# Patient Record
Sex: MALE | Race: OTHER | Employment: FULL TIME | URBAN - METROPOLITAN AREA
[De-identification: names, ages, dates, MRNs, and addresses within clinical notes are randomized per-mention and may not be internally consistent; named-entity substitution may affect disease eponyms.]

---

## 2022-12-21 ENCOUNTER — HOSPITAL ENCOUNTER (EMERGENCY)
Age: 62
Discharge: HOME | End: 2022-12-21
Attending: EMERGENCY MEDICINE
Payer: COMMERCIAL

## 2022-12-21 ENCOUNTER — APPOINTMENT (OUTPATIENT)
Dept: GENERAL RADIOLOGY | Age: 62
End: 2022-12-21
Attending: NURSE PRACTITIONER
Payer: COMMERCIAL

## 2022-12-21 VITALS
HEIGHT: 74 IN | TEMPERATURE: 99 F | OXYGEN SATURATION: 97 % | DIASTOLIC BLOOD PRESSURE: 64 MMHG | WEIGHT: 200 LBS | SYSTOLIC BLOOD PRESSURE: 134 MMHG | BODY MASS INDEX: 25.67 KG/M2 | RESPIRATION RATE: 20 BRPM | HEART RATE: 98 BPM

## 2022-12-21 DIAGNOSIS — R07.81 RIB PAIN ON RIGHT SIDE: ICD-10-CM

## 2022-12-21 DIAGNOSIS — U07.1 COVID: Primary | ICD-10-CM

## 2022-12-21 DIAGNOSIS — S22.41XA CLOSED FRACTURE OF MULTIPLE RIBS OF RIGHT SIDE, INITIAL ENCOUNTER: ICD-10-CM

## 2022-12-21 DIAGNOSIS — Z87.898 HISTORY OF FEVER: ICD-10-CM

## 2022-12-21 DIAGNOSIS — M79.10 MYALGIA: ICD-10-CM

## 2022-12-21 DIAGNOSIS — J11.1 INFLUENZA: ICD-10-CM

## 2022-12-21 LAB
POCT INFLUENZA A: NEGATIVE
POCT INFLUENZA B: POSITIVE
SARS-COV-2 RNA RESP QL NAA+PROBE: DETECTED

## 2022-12-21 PROCEDURE — 87502 INFLUENZA DNA AMP PROBE: CPT | Performed by: NURSE PRACTITIONER

## 2022-12-21 PROCEDURE — 99283 EMERGENCY DEPT VISIT LOW MDM: CPT

## 2022-12-21 PROCEDURE — 99284 EMERGENCY DEPT VISIT MOD MDM: CPT

## 2022-12-21 PROCEDURE — 71101 X-RAY EXAM UNILAT RIBS/CHEST: CPT | Performed by: NURSE PRACTITIONER

## 2022-12-21 RX ORDER — ATORVASTATIN CALCIUM 20 MG/1
20 TABLET, FILM COATED ORAL NIGHTLY
COMMUNITY

## 2022-12-21 RX ORDER — IBUPROFEN 600 MG/1
600 TABLET ORAL ONCE
Status: COMPLETED | OUTPATIENT
Start: 2022-12-21 | End: 2022-12-21

## 2022-12-21 RX ORDER — ASPIRIN 81 MG/1
TABLET, CHEWABLE ORAL DAILY
COMMUNITY

## 2022-12-21 RX ORDER — METOPROLOL SUCCINATE 50 MG/1
50 TABLET, EXTENDED RELEASE ORAL DAILY
COMMUNITY

## 2022-12-21 RX ORDER — BENZONATATE 100 MG/1
100 CAPSULE ORAL 3 TIMES DAILY PRN
Qty: 30 CAPSULE | Refills: 0 | Status: SHIPPED | OUTPATIENT
Start: 2022-12-21 | End: 2023-01-20

## 2022-12-21 NOTE — ED PROVIDER NOTES
I have seen and examined the patient and agree with the assessment and plan as outlined in the documentation by the physician's assistant. I provided a substantive portion of care for this patient. I personally performed the Medical Decision Making for this encounter. The patient presents with fever and coughing. The patient had a fall as well. He has had the body aches and chills with fever for the past 3 days. He also fell 3 days ago and landed on his right ribs. He has pain with breathing. Physical exam shows nontoxic appearing 65-year-old male. He appears to have no tachypnea. Good air entry noted on breathing. Some tenderness along the right ribs. He was found to be positive for COVID and influenza. X-rays were performed to assess for pneumothorax, rib fractures, contusion. X-ray showed possible nondisplaced fractures at ribs 8 and 9. He will be given incentive spirometer. Continue to monitor for any worsening symptoms. Pain control at home. Discharge good condition. He will need to follow-up with his physician regarding his COVID and influenza. Discussion on whether the patient would take Paxlovid he will follow-up with his primary care physician.

## 2022-12-21 NOTE — ED INITIAL ASSESSMENT (HPI)
To er with body aches and uri sx for past 2 days with fever that started today.   Last med for pain or fever was tylenol yesterday

## 2025-01-14 NOTE — ED QUICK NOTES
Patient was provided with Incentive Spirometry , and instructed on use. Patient verbalized understanding. No

## 2025-03-26 ENCOUNTER — OFFICE VISIT (OUTPATIENT)
Dept: INTERNAL MEDICINE CLINIC | Facility: CLINIC | Age: 65
End: 2025-03-26
Payer: COMMERCIAL

## 2025-03-26 VITALS
WEIGHT: 214.81 LBS | DIASTOLIC BLOOD PRESSURE: 68 MMHG | RESPIRATION RATE: 16 BRPM | TEMPERATURE: 98 F | BODY MASS INDEX: 27.57 KG/M2 | HEART RATE: 72 BPM | HEIGHT: 74 IN | SYSTOLIC BLOOD PRESSURE: 150 MMHG | OXYGEN SATURATION: 95 %

## 2025-03-26 DIAGNOSIS — Z12.11 SCREENING FOR COLON CANCER: ICD-10-CM

## 2025-03-26 DIAGNOSIS — E11.65 TYPE 2 DIABETES MELLITUS WITH HYPERGLYCEMIA, WITH LONG-TERM CURRENT USE OF INSULIN (HCC): ICD-10-CM

## 2025-03-26 DIAGNOSIS — Z79.4 TYPE 2 DIABETES MELLITUS WITH HYPERGLYCEMIA, WITH LONG-TERM CURRENT USE OF INSULIN (HCC): ICD-10-CM

## 2025-03-26 DIAGNOSIS — Z00.00 ROUTINE PHYSICAL EXAMINATION: Primary | ICD-10-CM

## 2025-03-26 DIAGNOSIS — I10 PRIMARY HYPERTENSION: ICD-10-CM

## 2025-03-26 DIAGNOSIS — M79.651 RIGHT THIGH PAIN: ICD-10-CM

## 2025-03-26 DIAGNOSIS — Z12.5 SCREENING FOR PROSTATE CANCER: ICD-10-CM

## 2025-03-26 DIAGNOSIS — E78.5 TYPE 2 DIABETES MELLITUS WITH HYPERLIPIDEMIA (HCC): ICD-10-CM

## 2025-03-26 DIAGNOSIS — E11.69 TYPE 2 DIABETES MELLITUS WITH HYPERLIPIDEMIA (HCC): ICD-10-CM

## 2025-03-26 PROCEDURE — 99386 PREV VISIT NEW AGE 40-64: CPT | Performed by: INTERNAL MEDICINE

## 2025-03-26 RX ORDER — HYDROCHLOROTHIAZIDE 12.5 MG/1
1 CAPSULE ORAL CONTINUOUS
Qty: 3 EACH | Refills: 0 | Status: SHIPPED | OUTPATIENT
Start: 2025-03-26

## 2025-03-26 RX ORDER — ACYCLOVIR 400 MG/1
TABLET ORAL
COMMUNITY
Start: 2024-01-25

## 2025-03-26 RX ORDER — INSULIN GLARGINE 100 [IU]/ML
INJECTION, SOLUTION SUBCUTANEOUS
COMMUNITY
Start: 2025-02-18

## 2025-03-26 RX ORDER — ATORVASTATIN CALCIUM 40 MG/1
40 TABLET, FILM COATED ORAL NIGHTLY
Qty: 90 TABLET | Refills: 0 | Status: SHIPPED | OUTPATIENT
Start: 2025-03-26

## 2025-03-26 NOTE — PATIENT INSTRUCTIONS
Please have blood tests done    Stop the lantus and lets continue the meal time insulin (insulin lispro)    Continue the metformin and atorvastatin    Lets stop the farxiga for now    Lets me know what  your sugars are in 1 week     Please do the FIT test for colon cancer screening    Try heat for 20 minutes then a few hours later try ice for 20 minutes for the thigh and see if that helps.  It may take up to 6 weeks to resolve    See me before you leave for your trip

## 2025-03-26 NOTE — PROGRESS NOTES
Patient Office Visit    ASSESSMENT AND PLAN:   1. Routine physical examination  Note: Continue to exercise at least 150 minutes a week and Eat a plant based diet. Please take 2000 IU of vitamin D daily for life to keep your bones strong. Please see your dentist every 6 months.  Continue with regular eye exams  - ALT(SGPT); Future  - AST (SGOT); Future  - Basic Metabolic Panel (8); Future  - Lipid Panel; Future  - CBC With Differential With Platelet; Future  - Hemoglobin A1C; Future  - TSH W Reflex To Free T4; Future  - Microalb/Creat Ratio, Random Urine; Future    2. Type 2 diabetes mellitus with hyperglycemia, with long-term current use of insulin (HCC)  Note: Advised to stop the Lantus and we will continue with the sliding scale insulin as patient feels that his sugar is very high after eating because he eats a good portion of food.  Will obtain A1c results.  As Farxiga is not covered through insurance.  And right now.  Will continue with the metformin  - Hemoglobin A1C; Future  - Microalb/Creat Ratio, Random Urine; Future  - Continuous Glucose Sensor (FREESTYLE ARNOL 3 PLUS SENSOR) Does not apply Misc; 1 Device continuous.  Dispense: 3 each; Refill: 0    3. Type 2 diabetes mellitus with hyperlipidemia (HCC)  Note: Continue statin  - Hemoglobin A1C; Future  - Microalb/Creat Ratio, Random Urine; Future  - atorvastatin 40 MG Oral Tab; Take 1 tablet (40 mg total) by mouth nightly.  Dispense: 90 tablet; Refill: 0    4. Screening for colon cancer  - Occult Blood, Fecal, Immunoassay (Blue cards) [E]; Future    5. Screening for prostate cancer  - PSA, Total (Screening) [E]; Future    6. Primary hypertension  Note: Will continue metoprolol for now and if blood pressure still high next visit then we will switch metoprolol to carvedilol or consider enalapril     7.  Right thigh pain  Note: Likely MSK strain.  Continue with heat/ice and it may take 6 weeks to resolve          Patient/Caregiver Education: Patient/Caregiver  Education: There are no barriers to learning. Medical education done. Outcome: Patient verbalizes understanding. Patient is notified to call with any questions, complications, allergies, or worsening or changing symptoms.  Patient is to call with any side effects or complications from the treatments as a result of today.      Reviewed Past Medical History and   Patient Active Problem List   Diagnosis    Type 2 diabetes mellitus with hyperlipidemia (HCC)    Type 2 diabetes mellitus with hyperglycemia, with long-term current use of insulin (HCC)    Primary hypertension       Orders Placed This Encounter   Procedures    ALT(SGPT)     Standing Status:   Future     Standing Expiration Date:   3/26/2026    AST (SGOT)     Standing Status:   Future     Standing Expiration Date:   3/26/2026    Basic Metabolic Panel (8)     Standing Status:   Future     Standing Expiration Date:   3/26/2026    Lipid Panel     Standing Status:   Future     Standing Expiration Date:   3/26/2026    CBC With Differential With Platelet     Standing Status:   Future     Standing Expiration Date:   3/26/2026    Hemoglobin A1C     Standing Status:   Future     Standing Expiration Date:   3/26/2026    TSH W Reflex To Free T4     Standing Status:   Future     Standing Expiration Date:   3/26/2026    Microalb/Creat Ratio, Random Urine     Standing Status:   Future     Standing Expiration Date:   3/26/2026     Order Specific Question:   Release to patient     Answer:   Immediate    Occult Blood, Fecal, Immunoassay (Blue cards) [E]     Standing Status:   Future     Standing Expiration Date:   3/26/2026     Order Specific Question:   Release to patient     Answer:   Immediate    PSA, Total (Screening) [E]     Standing Status:   Future     Standing Expiration Date:   3/26/2026     Order Specific Question:   Release to patient     Answer:   Immediate     Requested Prescriptions     Signed Prescriptions Disp Refills    Continuous Glucose Sensor (FREESTYLE  ARNOL 3 PLUS SENSOR) Does not apply Misc 3 each 0     Si Device continuous.    atorvastatin 40 MG Oral Tab 90 tablet 0     Sig: Take 1 tablet (40 mg total) by mouth nightly.         Florida Hua DO  CC:  Chief Complaint   Patient presents with    [facility]    Physical         HPI:   Karen Walker is a 64-year-old male who presents for a physical.  Son helps to translate    Type 2 diabetes: Patient has been on Lantus and short acting insulin for some time.  He has been having very low sugars at nighttime.  It is quite confusing as to what he is taking but he is taking 16 units with meals and may be taking 16 units of Lantus during lunchtime as well.  He may not be taking anything during dinnertime.  He has been taking Farxiga and metformin as well.  Farxiga is no longer covered.  They would prefer Dexcom, but Dexcom was not covered.  Hypertension/hyperlipidemia: Has been taking the medicine  Right thigh pain: Started about 2 weeks ago.  He has been walking on the treadmill 2 weeks ago as well.  No recent falls or trauma.  No fevers or chills.  He still works and is a     Past Medical History:    Diabetes (HCC)    Essential hypertension    Hyperlipidemia       History reviewed. No pertinent surgical history.    Social History:  Social History     Socioeconomic History    Marital status:    Tobacco Use    Smoking status: Never    Smokeless tobacco: Never   Vaping Use    Vaping status: Never Used   Substance and Sexual Activity    Alcohol use: Never    Drug use: Never   Other Topics Concern    Caffeine Concern No    Exercise No    Seat Belt Yes    Special Diet No    Stress Concern No    Weight Concern No     Social Drivers of Health     Food Insecurity: No Food Insecurity (3/26/2025)    NCSS - Food Insecurity     Worried About Running Out of Food in the Last Year: No     Ran Out of Food in the Last Year: No   Transportation Needs: No Transportation Needs (3/26/2025)    NCSS -  Transportation     Lack of Transportation: No   Stress: Not on File (10/7/2022)    Received from [employer]    Stress     Stress: 0   Housing Stability: Not At Risk (3/26/2025)    NCSS - Housing/Utilities     Has Housing: Yes     Worried About Losing Housing: No     Unable to Get Utilities: No     Family History:  Family History   Problem Relation Age of Onset    Diabetes Mother     Heart Disorder Mother     Hypertension Mother     Lipids Mother      Allergies:  Allergies[1]  Current Meds:  Medications Ordered Prior to Encounter[2]      REVIEW OF SYSTEMS   Constitutional: no fatigue normal energy no weight changes   HENT: normal sinuses and no mouth issues   Eyes: . normal vision no eye pain   Respiratory: normal respirations no cough   Cardiovascular: no CP, or palpitations   Gastrointestinal: normal bowels and no abd pains   Genitourinary:  normal urination no hematuria, no frequency   Musculoskeletal: no pains in arms/legs, normal range of motion   Skin: no rashes or skin lesions that are new   Neurological:  no weakness, no numbness, normal gait   Hematological:  no bruises or bleeding   Psychiatric/Behavioral: normal mood no anxiety normal behavior     /68 (BP Location: Right arm, Patient Position: Sitting, Cuff Size: adult)   Pulse 72   Temp 97.7 °F (36.5 °C) (Temporal)   Resp 16   Ht 6' 2\" (1.88 m)   Wt 214 lb 12.8 oz (97.4 kg)   SpO2 95%   BMI 27.58 kg/m²     PHYSICAL EXAM:   Constitutional: Vital signs reviewed as noted, well developed, in no acute distress.   HENT: NCAT, bilateral ear canal and tympanic membrane appear normal  Eyes: pupils reactive bilaterally  Neck: No thyroidmegaly  Cardiovascular: nl s1 s2 no m/r/g  Pulmonary/Chest: CTA bilaterally with no wheezes  Abdominal: Soft NT normal Bowel sounds  MSK: Full range of motion of the knee and leg  Extremities: no pedal edema   Neurological:  no weakness in UE and LE, reflexes are normal  Skin: no rashes or bruises on visualized skin, not  undressed   Psychiatric:normal mood              [1] No Known Allergies  [2]   Current Outpatient Medications on File Prior to Visit   Medication Sig Dispense Refill    Continuous Glucose Sensor (DEXCOM G7 SENSOR) Does not apply Misc       BASAGLAR KWIKPEN 100 UNIT/ML Subcutaneous Solution Pen-injector INJECT 20 UINITS DAILY      metFORMIN HCl 1000 MG Oral Tab Take 1 tablet (1,000 mg total) by mouth 2 (two) times daily with meals.      metoprolol succinate ER 50 MG Oral Tablet 24 Hr Take 1 tablet (50 mg total) by mouth daily.      aspirin 81 MG Oral Chew Tab Chew by mouth daily.       No current facility-administered medications on file prior to visit.

## 2025-03-28 ENCOUNTER — APPOINTMENT (OUTPATIENT)
Dept: LAB | Age: 65
End: 2025-03-28
Attending: INTERNAL MEDICINE
Payer: COMMERCIAL

## 2025-03-28 PROCEDURE — 82274 ASSAY TEST FOR BLOOD FECAL: CPT

## 2025-03-29 ENCOUNTER — LAB ENCOUNTER (OUTPATIENT)
Dept: LAB | Age: 65
End: 2025-03-29
Attending: INTERNAL MEDICINE
Payer: COMMERCIAL

## 2025-03-29 DIAGNOSIS — Z00.00 ROUTINE PHYSICAL EXAMINATION: ICD-10-CM

## 2025-03-29 DIAGNOSIS — E11.65 TYPE 2 DIABETES MELLITUS WITH HYPERGLYCEMIA, WITH LONG-TERM CURRENT USE OF INSULIN (HCC): ICD-10-CM

## 2025-03-29 DIAGNOSIS — Z12.5 SCREENING FOR PROSTATE CANCER: ICD-10-CM

## 2025-03-29 DIAGNOSIS — Z79.4 TYPE 2 DIABETES MELLITUS WITH HYPERGLYCEMIA, WITH LONG-TERM CURRENT USE OF INSULIN (HCC): ICD-10-CM

## 2025-03-29 DIAGNOSIS — Z12.11 SCREENING FOR COLON CANCER: ICD-10-CM

## 2025-03-29 DIAGNOSIS — E78.5 TYPE 2 DIABETES MELLITUS WITH HYPERLIPIDEMIA (HCC): ICD-10-CM

## 2025-03-29 DIAGNOSIS — E11.69 TYPE 2 DIABETES MELLITUS WITH HYPERLIPIDEMIA (HCC): ICD-10-CM

## 2025-03-29 LAB
ALT SERPL-CCNC: 25 U/L
ANION GAP SERPL CALC-SCNC: 11 MMOL/L (ref 0–18)
AST SERPL-CCNC: 19 U/L (ref ?–34)
BASOPHILS # BLD AUTO: 0 X10(3) UL (ref 0–0.2)
BASOPHILS NFR BLD AUTO: 0 %
BUN BLD-MCNC: 13 MG/DL (ref 9–23)
CALCIUM BLD-MCNC: 10.1 MG/DL (ref 8.7–10.6)
CHLORIDE SERPL-SCNC: 104 MMOL/L (ref 98–112)
CHOLEST SERPL-MCNC: 129 MG/DL (ref ?–200)
CO2 SERPL-SCNC: 27 MMOL/L (ref 21–32)
COMPLEXED PSA SERPL-MCNC: 0.7 NG/ML (ref ?–4)
CREAT BLD-MCNC: 0.85 MG/DL
CREAT UR-SCNC: 71.2 MG/DL
EGFRCR SERPLBLD CKD-EPI 2021: 98 ML/MIN/1.73M2 (ref 60–?)
EOSINOPHIL # BLD AUTO: 0.07 X10(3) UL (ref 0–0.7)
EOSINOPHIL NFR BLD AUTO: 2 %
ERYTHROCYTE [DISTWIDTH] IN BLOOD BY AUTOMATED COUNT: 12.9 %
EST. AVERAGE GLUCOSE BLD GHB EST-MCNC: 143 MG/DL (ref 68–126)
FASTING PATIENT LIPID ANSWER: YES
FASTING STATUS PATIENT QL REPORTED: YES
GLUCOSE BLD-MCNC: 151 MG/DL (ref 70–99)
HBA1C MFR BLD: 6.6 % (ref ?–5.7)
HCT VFR BLD AUTO: 45.8 %
HDLC SERPL-MCNC: 37 MG/DL (ref 40–59)
HEMOCCULT STL QL: NEGATIVE
HGB BLD-MCNC: 15.6 G/DL
IMM GRANULOCYTES # BLD AUTO: 0.01 X10(3) UL (ref 0–1)
IMM GRANULOCYTES NFR BLD: 0.3 %
LDLC SERPL CALC-MCNC: 74 MG/DL (ref ?–100)
LYMPHOCYTES # BLD AUTO: 1.55 X10(3) UL (ref 1–4)
LYMPHOCYTES NFR BLD AUTO: 44.4 %
MCH RBC QN AUTO: 30.4 PG (ref 26–34)
MCHC RBC AUTO-ENTMCNC: 34.1 G/DL (ref 31–37)
MCV RBC AUTO: 89.1 FL
MICROALBUMIN UR-MCNC: <0.3 MG/DL
MONOCYTES # BLD AUTO: 0.5 X10(3) UL (ref 0.1–1)
MONOCYTES NFR BLD AUTO: 14.3 %
NEUTROPHILS # BLD AUTO: 1.36 X10 (3) UL (ref 1.5–7.7)
NEUTROPHILS # BLD AUTO: 1.36 X10(3) UL (ref 1.5–7.7)
NEUTROPHILS NFR BLD AUTO: 39 %
NONHDLC SERPL-MCNC: 92 MG/DL (ref ?–130)
OSMOLALITY SERPL CALC.SUM OF ELEC: 297 MOSM/KG (ref 275–295)
PLATELET # BLD AUTO: 152 10(3)UL (ref 150–450)
POTASSIUM SERPL-SCNC: 4.3 MMOL/L (ref 3.5–5.1)
RBC # BLD AUTO: 5.14 X10(6)UL
SODIUM SERPL-SCNC: 142 MMOL/L (ref 136–145)
TRIGL SERPL-MCNC: 97 MG/DL (ref 30–149)
TSI SER-ACNC: 0.84 UIU/ML (ref 0.55–4.78)
VLDLC SERPL CALC-MCNC: 15 MG/DL (ref 0–30)
WBC # BLD AUTO: 3.5 X10(3) UL (ref 4–11)

## 2025-03-29 PROCEDURE — 83036 HEMOGLOBIN GLYCOSYLATED A1C: CPT

## 2025-03-29 PROCEDURE — 36415 COLL VENOUS BLD VENIPUNCTURE: CPT

## 2025-03-29 PROCEDURE — 82570 ASSAY OF URINE CREATININE: CPT

## 2025-03-29 PROCEDURE — 80061 LIPID PANEL: CPT

## 2025-03-29 PROCEDURE — 84460 ALANINE AMINO (ALT) (SGPT): CPT

## 2025-03-29 PROCEDURE — 82043 UR ALBUMIN QUANTITATIVE: CPT

## 2025-03-29 PROCEDURE — 85025 COMPLETE CBC W/AUTO DIFF WBC: CPT

## 2025-03-29 PROCEDURE — 84443 ASSAY THYROID STIM HORMONE: CPT

## 2025-03-29 PROCEDURE — 80048 BASIC METABOLIC PNL TOTAL CA: CPT

## 2025-03-29 PROCEDURE — 84450 TRANSFERASE (AST) (SGOT): CPT

## 2025-03-31 NOTE — PROGRESS NOTES
Dear RN, please let the patient know   1. Diabetes is well controlled. Any low sugars since stopping the lantus? Did the freestyle brayan get approved? Our goal A1c is to be less than 7. We can remain off farxiga as well  2. Kidney function is normal  3. Cholesterol is in good range  4. Your white blood cell count is slightly low  5. Your liver function is normal  6. Thyroid function is normal  7. Urine test shows that the kidneys are functioning well from a diabetes standpoint  8. Prostate blood test is normal  9. Stool test was negative for blood  Please let me know if you have any questions  Floriad Hua DO

## 2025-04-02 ENCOUNTER — TELEPHONE (OUTPATIENT)
Dept: INTERNAL MEDICINE CLINIC | Facility: CLINIC | Age: 65
End: 2025-04-02

## 2025-04-02 RX ORDER — ACYCLOVIR 400 MG/1
1 TABLET ORAL
Qty: 3 EACH | Refills: 0 | Status: SHIPPED | OUTPATIENT
Start: 2025-04-02 | End: 2025-04-02

## 2025-04-02 RX ORDER — ACYCLOVIR 400 MG/1
1 TABLET ORAL
Qty: 3 EACH | Refills: 0 | Status: SHIPPED | OUTPATIENT
Start: 2025-04-02

## 2025-04-02 NOTE — TELEPHONE ENCOUNTER
Daughter calling to follow up on CGM. Let her know it was sent today. PT states they need it sent to [facility] not the [facility]

## 2025-04-02 NOTE — TELEPHONE ENCOUNTER
Attempted to call daughter, OK per verbal release. Please let patient's daughter know that CGM has been sent to [facility] pharmacy.

## 2025-04-02 NOTE — TELEPHONE ENCOUNTER
I will send the Dexcom as that is what the patient preferred.  He has a freestyle brayan at home.  We will see if a PA is needed    Florida Hua DO

## 2025-05-11 ENCOUNTER — PATIENT MESSAGE (OUTPATIENT)
Dept: INTERNAL MEDICINE CLINIC | Facility: CLINIC | Age: 65
End: 2025-05-11

## 2025-05-11 DIAGNOSIS — E78.5 TYPE 2 DIABETES MELLITUS WITH HYPERLIPIDEMIA (HCC): ICD-10-CM

## 2025-05-11 DIAGNOSIS — E11.65 TYPE 2 DIABETES MELLITUS WITH HYPERGLYCEMIA, WITH LONG-TERM CURRENT USE OF INSULIN (HCC): Primary | ICD-10-CM

## 2025-05-11 DIAGNOSIS — Z79.4 TYPE 2 DIABETES MELLITUS WITH HYPERGLYCEMIA, WITH LONG-TERM CURRENT USE OF INSULIN (HCC): Primary | ICD-10-CM

## 2025-05-11 DIAGNOSIS — E11.69 TYPE 2 DIABETES MELLITUS WITH HYPERLIPIDEMIA (HCC): ICD-10-CM

## 2025-05-12 RX ORDER — HYDROCHLOROTHIAZIDE 12.5 MG/1
1 CAPSULE ORAL CONTINUOUS
Qty: 3 EACH | Refills: 0 | Status: SHIPPED | OUTPATIENT
Start: 2025-05-12

## 2025-06-26 DIAGNOSIS — Z79.4 TYPE 2 DIABETES MELLITUS WITH HYPERGLYCEMIA, WITH LONG-TERM CURRENT USE OF INSULIN (HCC): ICD-10-CM

## 2025-06-26 DIAGNOSIS — E11.69 TYPE 2 DIABETES MELLITUS WITH HYPERLIPIDEMIA (HCC): ICD-10-CM

## 2025-06-26 DIAGNOSIS — E78.5 TYPE 2 DIABETES MELLITUS WITH HYPERLIPIDEMIA (HCC): ICD-10-CM

## 2025-06-26 DIAGNOSIS — E11.65 TYPE 2 DIABETES MELLITUS WITH HYPERGLYCEMIA, WITH LONG-TERM CURRENT USE OF INSULIN (HCC): ICD-10-CM

## 2025-06-27 RX ORDER — HYDROCHLOROTHIAZIDE 12.5 MG/1
1 CAPSULE ORAL CONTINUOUS
Qty: 3 EACH | Refills: 11 | Status: SHIPPED | OUTPATIENT
Start: 2025-06-27

## 2025-06-27 RX ORDER — HYDROCHLOROTHIAZIDE 12.5 MG/1
1 CAPSULE ORAL CONTINUOUS
Qty: 3 EACH | Refills: 0 | Status: SHIPPED | OUTPATIENT
Start: 2025-06-27 | End: 2025-06-27

## 2025-06-27 NOTE — TELEPHONE ENCOUNTER
Protocol failed    Requesting Continuous Glucose Sensor (FREESTYLE ARNOL 3 PLUS SENSOR) Does not apply Misc   LOV: 03/26/25  RTC: no follow-ups on file  Last Relevant Labs: 03/29/25  Filled: 05/12/25 #3 each with 0 refills    No future appointments.

## 2025-08-05 RX ORDER — INSULIN LISPRO 100 [IU]/ML
INJECTION, SOLUTION INTRAVENOUS; SUBCUTANEOUS
COMMUNITY
Start: 2025-04-11 | End: 2025-08-06

## 2025-08-05 RX ORDER — DAPAGLIFLOZIN 10 MG/1
10 TABLET, FILM COATED ORAL DAILY
COMMUNITY
Start: 2025-06-20 | End: 2025-08-06 | Stop reason: ALTCHOICE

## 2025-08-06 ENCOUNTER — OFFICE VISIT (OUTPATIENT)
Dept: INTERNAL MEDICINE CLINIC | Facility: CLINIC | Age: 65
End: 2025-08-06

## 2025-08-06 VITALS
HEART RATE: 66 BPM | TEMPERATURE: 98 F | HEIGHT: 74 IN | RESPIRATION RATE: 18 BRPM | BODY MASS INDEX: 27.62 KG/M2 | WEIGHT: 215.19 LBS | SYSTOLIC BLOOD PRESSURE: 126 MMHG | OXYGEN SATURATION: 96 % | DIASTOLIC BLOOD PRESSURE: 74 MMHG

## 2025-08-06 DIAGNOSIS — Z79.4 TYPE 2 DIABETES MELLITUS WITH HYPERGLYCEMIA, WITH LONG-TERM CURRENT USE OF INSULIN (HCC): Primary | ICD-10-CM

## 2025-08-06 DIAGNOSIS — I10 PRIMARY HYPERTENSION: ICD-10-CM

## 2025-08-06 DIAGNOSIS — E78.5 TYPE 2 DIABETES MELLITUS WITH HYPERLIPIDEMIA (HCC): ICD-10-CM

## 2025-08-06 DIAGNOSIS — F51.01 PRIMARY INSOMNIA: ICD-10-CM

## 2025-08-06 DIAGNOSIS — E11.69 TYPE 2 DIABETES MELLITUS WITH HYPERLIPIDEMIA (HCC): ICD-10-CM

## 2025-08-06 DIAGNOSIS — E11.65 TYPE 2 DIABETES MELLITUS WITH HYPERGLYCEMIA, WITH LONG-TERM CURRENT USE OF INSULIN (HCC): Primary | ICD-10-CM

## 2025-08-06 LAB — HEMOGLOBIN A1C: 8.3 % (ref 4.3–5.6)

## 2025-08-06 PROCEDURE — 83036 HEMOGLOBIN GLYCOSYLATED A1C: CPT | Performed by: INTERNAL MEDICINE

## 2025-08-06 PROCEDURE — 99214 OFFICE O/P EST MOD 30 MIN: CPT | Performed by: INTERNAL MEDICINE

## 2025-08-06 RX ORDER — INSULIN LISPRO 100 [IU]/ML
10 INJECTION, SOLUTION INTRAVENOUS; SUBCUTANEOUS 2 TIMES DAILY WITH MEALS
Qty: 45 ML | Refills: 0 | Status: SHIPPED | OUTPATIENT
Start: 2025-08-06

## 2025-08-06 RX ORDER — INSULIN GLARGINE 100 [IU]/ML
INJECTION, SOLUTION SUBCUTANEOUS
Qty: 45 ML | Refills: 0 | Status: SHIPPED | OUTPATIENT
Start: 2025-08-06

## 2025-08-12 DIAGNOSIS — E78.5 TYPE 2 DIABETES MELLITUS WITH HYPERLIPIDEMIA (HCC): ICD-10-CM

## 2025-08-12 DIAGNOSIS — E11.69 TYPE 2 DIABETES MELLITUS WITH HYPERLIPIDEMIA (HCC): ICD-10-CM

## 2025-08-12 DIAGNOSIS — Z79.4 TYPE 2 DIABETES MELLITUS WITH HYPERGLYCEMIA, WITH LONG-TERM CURRENT USE OF INSULIN (HCC): ICD-10-CM

## 2025-08-12 DIAGNOSIS — E11.65 TYPE 2 DIABETES MELLITUS WITH HYPERGLYCEMIA, WITH LONG-TERM CURRENT USE OF INSULIN (HCC): ICD-10-CM

## 2025-08-14 RX ORDER — HYDROCHLOROTHIAZIDE 12.5 MG/1
1 CAPSULE ORAL CONTINUOUS
Qty: 3 EACH | Refills: 11 | Status: SHIPPED | OUTPATIENT
Start: 2025-08-14

## 2025-08-25 ENCOUNTER — PATIENT MESSAGE (OUTPATIENT)
Dept: INTERNAL MEDICINE CLINIC | Facility: CLINIC | Age: 65
End: 2025-08-25

## 2025-08-25 RX ORDER — METOPROLOL SUCCINATE 50 MG/1
50 TABLET, EXTENDED RELEASE ORAL DAILY
Qty: 90 TABLET | Refills: 1 | Status: SHIPPED | OUTPATIENT
Start: 2025-08-25

## (undated) NOTE — LETTER
4/7/2025    Karen Walker  [address] [address] [facility] [address] [address]         Dear Karen,    This letter is to inform you that our office has made several attempts to reach you by phone without success.  We were attempting to contact you by phone regarding prescription request.    Please contact our office at the number listed below as soon as you receive this letter to discuss this issue and to make the necessary changes in our system to your contact information.  Thank you for your cooperation.        Sincerely,    Florida Hua, DO  [address]  [facility] [address]  Ph: [phone]  Fax: [phone]         Document electronically generated by:  Miracle ADAMS RN